# Patient Record
(demographics unavailable — no encounter records)

---

## 2024-12-12 NOTE — HISTORY OF PRESENT ILLNESS
[FreeTextEntry1] : Follow-up visit. The patient is a 58 yo woman with a history of:  SLE: diagnosed 1997 with malar rash, discoid lesions, alopecia, oral ulcers and fevers. Has also experienced leucopenia, lymphadenopathy, malar rash, episcleritis, pleuritis (2007), right eye episcleritis and arthritis. Treated initially with HCQ and steroids. Azathioprine added 12/14/05 - 7/06; it was stopped due to lack of efficacy (she flared on 150mg qd) and leucopenia. Cellcept/MMF was added 9/14/07 and she did well, stopped prednisone on 3/30/09. Not clear when she stopped MMF but off all DMARDs since at least 2014, on HCQ alone until 3/4/21 when she flared with polyarthritis, alopecia, weight loss on ; treated with steroids and increasing HCQ back to 400. Recurrent flare 3/31/22 with arthritis, weight loss and fatigue treated with a steroid burst. Flare 3/2/23 with arthritis and continued weight loss; prednisone 30 and MMF added. Serologies: 3/17: ACL-, LAC-, Ro 1.6, La-, anti-DNA chronically elevated  SLE Clinical Trials: She was enrolled in: 11/05- 12/05: open-label study of anti-CD22 and received 4 weekly doses 4/08-9/08: Phase I placebo-controlled trial of 1 SQ dose of  (ICOS inhibitor) or placebo 1/10- present: Phase Ib, placebo-controlled trial of  (anti-IFN gamma). 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 COVID infection #2 12/10/22; mild congestion  s/p shingrix 4/22, 8/22  Pulmonary: Admitted 12/2 - 12/6/23 for bi-basilar pneumonia with prolonged illness including persistent productive cough and weight loss. Did not recover until 9/24   Current Hx: Last seen 9/19/24; much better, had gained 14 lb and exercise tolerance almost back to baseline. Says she has continued to improve- cough is very infrequent, no exercise restrictions. Saw pulmonary in October and had a CT chest and an ?esophagram to see. if swallowing was normal. Has gained another 6 lb and is back to her normal weight. Still c/o hair loss. She was not able to get the clobetasol foam. Denies fevers, rash, oral/nasal ulcers, chest pain, nausea, diarrhea, joint pain. She continues to exercise at home.  Medications: hydroxychloroquine 400 mg qd, lisinopril 40 mg qd, MVI, ibuprofen 800 mg TID PRN,  mg BID (4/28/23; decreased 2/15/24), fluticasone inhaler (stopped)  PE:  Skin: no rash HEENT: no oral/nasal lesions, diffuse and patchy alopecia L>R with some discoid (left side has increased baldness) no lymphadenopathy Lungs: decreased BS BL (difficult to hear) Heart: S1S2, reg rate, 2/6 systolic murmer Abd: soft, non-tender, pulsatile swelling to the left of the umbilicus (same) Ext: no synovitis, no edema Neuro: non-focal

## 2024-12-12 NOTE — ASSESSMENT
[FreeTextEntry1] : 1. SLE: S/p recurrent flares with arthritis, weight loss and alopecia 3/21, 3/22, and 3/23. All improved with steroid burst, MMF added 4/28/23 (had done well on this previously). All symptoms resolved as of 11/23; subsequent weight loss (18 lb since 11/23) since then attributed to pulmonary issues. She is finally back to her baseline weight. Only SLE issue is the hair loss. To continue HCQ and  mg BID, use the steroid ointment on her scalp and ask the pharmacy about the clobetasol foam. Labs fine last visit- check again today.   2. Pulmonary: hospitalized with pneumonia 12/2/23 and recovery has been very slow. Cough and sputum production, impaired exercise tolerance have resolved but lung exam is not normal. WIll f/u on CT results at [facility].  f/u 2 months   Saludhartnych: [id number], [id number]  f/u 8-10 weeks  PILLO [place] office: [phone] cell: [phone] [email or web address].

## 2025-03-20 NOTE — ASSESSMENT
[FreeTextEntry1] : 1. SLE: S/p recurrent flares with arthritis, weight loss and alopecia 3/21, 3/22, and 3/23. All improved with steroid burst, MMF added 4/28/23 (had done well on this previously). All symptoms resolved as of 11/23; subsequent weight loss (18 lb since 11/23) since then attributed to pulmonary issues. Pulmonary infection took almost a year to resolve, back to her baseline weight as of 12/24. Currently well with no signs of disease activity. Check labs today and decrease MMF to 500 mg daily. Has Ophtho appt 7/25  2. Pulmonary: hospitalized with pneumonia 12/2/23 and recovery has been very slow. Cough and sputum production, impaired exercise tolerance have resolved but lung exam is not normal. WIll f/u on CT results at [facility]. Encouraged to get the COVID and pneumococcal vaccines  f/u 2-3 months   MyChartnych: [id number], [id number]  f/u 8-10 weeks  PILLO [place] office: [phone] cell: [phone] [email or web address].

## 2025-03-20 NOTE — HISTORY OF PRESENT ILLNESS
[FreeTextEntry1] : Follow-up visit. The patient is a 60 yo woman with a history of:  SLE: diagnosed 1997 with malar rash, discoid lesions, alopecia, oral ulcers and fevers. Has also experienced leucopenia, lymphadenopathy, malar rash, episcleritis, pleuritis (2007), right eye episcleritis and arthritis. Treated initially with HCQ and steroids. Azathioprine added 12/14/05 - 7/06; it was stopped due to lack of efficacy (she flared on 150mg qd) and leucopenia. Cellcept/MMF was added 9/14/07 and she did well, stopped prednisone on 3/30/09. Not clear when she stopped MMF but off all DMARDs since at least 2014, on HCQ alone until 3/4/21 when she flared with polyarthritis, alopecia, weight loss on ; treated with steroids and increasing HCQ back to 400. Recurrent flare 3/31/22 with arthritis, weight loss and fatigue treated with a steroid burst. Flare 3/2/23 with arthritis and continued weight loss; prednisone 30 and MMF added. Serologies: 3/17: ACL-, LAC-, Ro 1.6, La-, anti-DNA chronically elevated  SLE Clinical Trials: She was enrolled in: 11/05- 12/05: open-label study of anti-CD22 and received 4 weekly doses 4/08-9/08: Phase I placebo-controlled trial of 1 SQ dose of  (ICOS inhibitor) or placebo 1/10- present: Phase Ib, placebo-controlled trial of  (anti-IFN gamma). 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 COVID infection #2 12/10/22; mild congestion  s/p shingrix 4/22, 8/22, flu 2024  Pulmonary: Admitted 12/2 - 12/6/23 for bi-basilar pneumonia with prolonged illness including persistent productive cough and weight loss. Did not recover until 9/24   Current Hx: Last seen 12/12/24; doing well, back to normal weight. Feels well and has no complaints. Cough is very rare- only when she goes out in the cold. Still c/o hair loss. Has not had pneumovax or COVID vaccines yet. Denies fevers, rash, oral/nasal ulcers, chest pain, nausea, diarrhea, joint pain. She continues to exercise at home.  Medications: hydroxychloroquine 400 mg qd, lisinopril 40 mg qd, MVI, ibuprofen 800 mg TID PRN,  mg BID (4/28/23; decreased 2/15/24), fluticasone inhaler (stopped)  PE:  Skin: no rash HEENT: no oral/nasal lesions, diffuse and patchy alopecia L>R with some discoid (same) cervical lymphadenopathy 0.5 cm Lungs: decreased BS BL (difficult to hear) Heart: S1S2, reg rate, 2/6 systolic murmer Abd: soft, non-tender, pulsatile swelling to the left of the umbilicus (same) Ext: no synovitis, no edema Neuro: non-focal

## 2025-06-26 NOTE — ASSESSMENT
[FreeTextEntry1] : 1. SLE: S/p recurrent flares with arthritis, weight loss and alopecia 3/21, 3/22, and 3/23. All improved with steroid burst, MMF added 4/28/23 (had done well on this previously). All symptoms resolved as of 11/23; subsequent weight loss (18 lb since 11/23) since then attributed to pulmonary issues. Pulmonary infection took almost a year to resolve, back to her baseline weight as of 12/24. Currently well with no signs of disease activity. Check labs today and continue  mg daily. Has Ophtho appt 7/25  2. Pulmonary: hospitalized with pneumonia 12/2/23 and recovery has been very slow. Cough and sputum production, impaired exercise tolerance have resolved but has chronic cough and recurrent URIs. Has f/u with pulmonary at [facility] 8/8/25.  Encouraged again to get the COVID and pneumococcal vaccines  f/u 2-3 months   MyChartnych: [id number], [id number]  M [place] office: [phone] cell: [phone] [email or web address].

## 2025-06-26 NOTE — HISTORY OF PRESENT ILLNESS
[FreeTextEntry1] : Follow-up visit. The patient is a 61 yo woman with a history of:  SLE: diagnosed 1997 with malar rash, discoid lesions, alopecia, oral ulcers and fevers. Has also experienced leucopenia, lymphadenopathy, malar rash, episcleritis, pleuritis (2007), right eye episcleritis and arthritis. Treated initially with HCQ and steroids. Azathioprine added 12/14/05 - 7/06; it was stopped due to lack of efficacy (she flared on 150mg qd) and leucopenia. Cellcept/MMF was added 9/14/07 and she did well, stopped prednisone on 3/30/09. Not clear when she stopped MMF but off all DMARDs since at least 2014, on HCQ alone until 3/4/21 when she flared with polyarthritis, alopecia, weight loss on ; treated with steroids and increasing HCQ back to 400. Recurrent flare 3/31/22 with arthritis, weight loss and fatigue treated with a steroid burst. Flare 3/2/23 with arthritis and continued weight loss; prednisone 30 and MMF added. Serologies: 3/17: ACL-, LAC-, Ro 1.6, La-, anti-DNA chronically elevated  SLE Clinical Trials: She was enrolled in: 11/05- 12/05: open-label study of anti-CD22 and received 4 weekly doses 4/08-9/08: Phase I placebo-controlled trial of 1 SQ dose of  (ICOS inhibitor) or placebo 1/10- present: Phase Ib, placebo-controlled trial of  (anti-IFN gamma). 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 COVID infection #2 12/10/22; mild congestion  s/p shingrix 4/22, 8/22, flu 2024  Pulmonary: Admitted 12/2 - 12/6/23 for bi-basilar pneumonia with prolonged illness including persistent productive cough and weight loss. Did not recover until 9/24   Current Hx: Last seen 3/20/25; doing well, back to normal weight. Says she has a cold and has been having pain in her low back and ankles. The pain is related to standing a lot. Still has hair loss. Thinks she has been getting a lot of colds and still has a productive cough every day. Denies fevers, chest pain, dyspnea, rash, oral/nasal ulcers, chest pain, nausea, diarrhea. She continues to exercise at home.  Medications: hydroxychloroquine 400 mg qd, lisinopril 40 mg qd, MVI, ibuprofen 800 mg TID PRN,  mg QD (4/28/23; decreased 2/15/24), fluticasone inhaler (stopped)  PE:  Skin: no rash HEENT: no oral/nasal lesions, diffuse and patchy alopecia L>R with some inactive discoid (same) no lymphadenopathy  Lungs: clear BL Heart: S1S2, reg rate, 2/6 systolic murmer Abd: soft, non-tender, pulsatile swelling to the left of the umbilicus (same) Ext: no synovitis, no edema Back: no tenderness Neuro: non-focal